# Patient Record
Sex: FEMALE | Race: WHITE | NOT HISPANIC OR LATINO | URBAN - METROPOLITAN AREA
[De-identification: names, ages, dates, MRNs, and addresses within clinical notes are randomized per-mention and may not be internally consistent; named-entity substitution may affect disease eponyms.]

---

## 2017-10-26 ENCOUNTER — OUTPATIENT (OUTPATIENT)
Dept: OUTPATIENT SERVICES | Facility: HOSPITAL | Age: 37
LOS: 1 days | Discharge: HOME | End: 2017-10-26

## 2017-10-30 LAB — SURGICAL PATHOLOGY STUDY: SIGNIFICANT CHANGE UP

## 2017-11-01 DIAGNOSIS — N84.0 POLYP OF CORPUS UTERI: ICD-10-CM

## 2017-11-01 DIAGNOSIS — Z88.1 ALLERGY STATUS TO OTHER ANTIBIOTIC AGENTS STATUS: ICD-10-CM

## 2018-09-21 ENCOUNTER — OUTPATIENT (OUTPATIENT)
Dept: OUTPATIENT SERVICES | Facility: HOSPITAL | Age: 38
LOS: 1 days | End: 2018-09-21
Payer: COMMERCIAL

## 2018-09-21 DIAGNOSIS — O26.899 OTHER SPECIFIED PREGNANCY RELATED CONDITIONS, UNSPECIFIED TRIMESTER: ICD-10-CM

## 2018-09-21 DIAGNOSIS — Z3A.00 WEEKS OF GESTATION OF PREGNANCY NOT SPECIFIED: ICD-10-CM

## 2018-09-21 LAB — AMNISURE ROM (RUPTURE OF MEMBRANES): NEGATIVE — SIGNIFICANT CHANGE UP

## 2018-09-21 PROCEDURE — 99214 OFFICE O/P EST MOD 30 MIN: CPT

## 2018-09-21 PROCEDURE — 84112 EVAL AMNIOTIC FLUID PROTEIN: CPT

## 2018-10-04 ENCOUNTER — INPATIENT (INPATIENT)
Facility: HOSPITAL | Age: 38
LOS: 2 days | Discharge: HOME | End: 2018-10-07
Attending: OBSTETRICS & GYNECOLOGY | Admitting: OBSTETRICS & GYNECOLOGY
Payer: COMMERCIAL

## 2018-10-04 VITALS — HEIGHT: 60 IN | WEIGHT: 134.92 LBS

## 2018-10-04 DIAGNOSIS — O26.899 OTHER SPECIFIED PREGNANCY RELATED CONDITIONS, UNSPECIFIED TRIMESTER: ICD-10-CM

## 2018-10-04 DIAGNOSIS — Z3A.00 WEEKS OF GESTATION OF PREGNANCY NOT SPECIFIED: ICD-10-CM

## 2018-10-04 LAB
BASOPHILS NFR BLD AUTO: 0.1 % — SIGNIFICANT CHANGE UP (ref 0–2)
BLD GP AB SCN SERPL QL: POSITIVE — SIGNIFICANT CHANGE UP
BLD GP AB SCN SERPL QL: POSITIVE — SIGNIFICANT CHANGE UP
EOSINOPHIL NFR BLD AUTO: 0.1 % — SIGNIFICANT CHANGE UP (ref 0–6)
HCT VFR BLD CALC: 39.6 % — SIGNIFICANT CHANGE UP (ref 34.5–45)
HGB BLD-MCNC: 13.6 G/DL — SIGNIFICANT CHANGE UP (ref 11.5–15.5)
LYMPHOCYTES # BLD AUTO: 8.6 % — LOW (ref 13–44)
MCHC RBC-ENTMCNC: 31.8 PG — SIGNIFICANT CHANGE UP (ref 27–34)
MCHC RBC-ENTMCNC: 34.3 G/DL — SIGNIFICANT CHANGE UP (ref 32–36)
MCV RBC AUTO: 92.5 FL — SIGNIFICANT CHANGE UP (ref 80–100)
MONOCYTES NFR BLD AUTO: 7 % — SIGNIFICANT CHANGE UP (ref 2–14)
NEUTROPHILS NFR BLD AUTO: 84.2 % — HIGH (ref 43–77)
PLATELET # BLD AUTO: 163 K/UL — SIGNIFICANT CHANGE UP (ref 150–400)
RBC # BLD: 4.28 M/UL — SIGNIFICANT CHANGE UP (ref 3.8–5.2)
RBC # FLD: 13.4 % — SIGNIFICANT CHANGE UP (ref 10.3–16.9)
RH IG SCN BLD-IMP: NEGATIVE — SIGNIFICANT CHANGE UP
RH IG SCN BLD-IMP: NEGATIVE — SIGNIFICANT CHANGE UP
WBC # BLD: 11.2 K/UL — HIGH (ref 3.8–10.5)
WBC # FLD AUTO: 11.2 K/UL — HIGH (ref 3.8–10.5)

## 2018-10-04 PROCEDURE — 86077 PHYS BLOOD BANK SERV XMATCH: CPT

## 2018-10-04 RX ORDER — ONDANSETRON 8 MG/1
2 TABLET, FILM COATED ORAL EVERY 6 HOURS
Qty: 0 | Refills: 0 | Status: DISCONTINUED | OUTPATIENT
Start: 2018-10-04 | End: 2018-10-07

## 2018-10-04 RX ORDER — CEFAZOLIN SODIUM 1 G
2000 VIAL (EA) INJECTION EVERY 8 HOURS
Qty: 0 | Refills: 0 | Status: COMPLETED | OUTPATIENT
Start: 2018-10-04 | End: 2018-10-05

## 2018-10-04 RX ORDER — PENICILLIN G POTASSIUM 5000000 [IU]/1
POWDER, FOR SOLUTION INTRAMUSCULAR; INTRAPLEURAL; INTRATHECAL; INTRAVENOUS
Qty: 0 | Refills: 0 | Status: DISCONTINUED | OUTPATIENT
Start: 2018-10-04 | End: 2018-10-04

## 2018-10-04 RX ORDER — LANOLIN
1 OINTMENT (GRAM) TOPICAL
Qty: 0 | Refills: 0 | Status: DISCONTINUED | OUTPATIENT
Start: 2018-10-04 | End: 2018-10-07

## 2018-10-04 RX ORDER — SODIUM CHLORIDE 9 MG/ML
1000 INJECTION, SOLUTION INTRAVENOUS
Qty: 0 | Refills: 0 | Status: DISCONTINUED | OUTPATIENT
Start: 2018-10-04 | End: 2018-10-04

## 2018-10-04 RX ORDER — SODIUM CHLORIDE 9 MG/ML
1000 INJECTION, SOLUTION INTRAVENOUS ONCE
Qty: 0 | Refills: 0 | Status: COMPLETED | OUTPATIENT
Start: 2018-10-04 | End: 2018-10-04

## 2018-10-04 RX ORDER — NALOXONE HYDROCHLORIDE 4 MG/.1ML
0.1 SPRAY NASAL
Qty: 0 | Refills: 0 | Status: DISCONTINUED | OUTPATIENT
Start: 2018-10-04 | End: 2018-10-07

## 2018-10-04 RX ORDER — FERROUS SULFATE 325(65) MG
325 TABLET ORAL DAILY
Qty: 0 | Refills: 0 | Status: DISCONTINUED | OUTPATIENT
Start: 2018-10-04 | End: 2018-10-07

## 2018-10-04 RX ORDER — PENICILLIN G POTASSIUM 5000000 [IU]/1
2.5 POWDER, FOR SOLUTION INTRAMUSCULAR; INTRAPLEURAL; INTRATHECAL; INTRAVENOUS EVERY 4 HOURS
Qty: 0 | Refills: 0 | Status: DISCONTINUED | OUTPATIENT
Start: 2018-10-04 | End: 2018-10-04

## 2018-10-04 RX ORDER — FENTANYL/BUPIVACAINE/NS/PF 2MCG/ML-.1
250 PLASTIC BAG, INJECTION (ML) INJECTION
Qty: 0 | Refills: 0 | Status: DISCONTINUED | OUTPATIENT
Start: 2018-10-04 | End: 2018-10-04

## 2018-10-04 RX ORDER — CEFAZOLIN SODIUM 1 G
1000 VIAL (EA) INJECTION EVERY 8 HOURS
Qty: 0 | Refills: 0 | Status: DISCONTINUED | OUTPATIENT
Start: 2018-10-04 | End: 2018-10-04

## 2018-10-04 RX ORDER — IBUPROFEN 200 MG
600 TABLET ORAL EVERY 6 HOURS
Qty: 0 | Refills: 0 | Status: DISCONTINUED | OUTPATIENT
Start: 2018-10-04 | End: 2018-10-07

## 2018-10-04 RX ORDER — OXYCODONE AND ACETAMINOPHEN 5; 325 MG/1; MG/1
1 TABLET ORAL
Qty: 0 | Refills: 0 | Status: DISCONTINUED | OUTPATIENT
Start: 2018-10-04 | End: 2018-10-07

## 2018-10-04 RX ORDER — DOCUSATE SODIUM 100 MG
100 CAPSULE ORAL
Qty: 0 | Refills: 0 | Status: DISCONTINUED | OUTPATIENT
Start: 2018-10-04 | End: 2018-10-07

## 2018-10-04 RX ORDER — SODIUM CHLORIDE 9 MG/ML
1000 INJECTION, SOLUTION INTRAVENOUS
Qty: 0 | Refills: 0 | Status: DISCONTINUED | OUTPATIENT
Start: 2018-10-04 | End: 2018-10-07

## 2018-10-04 RX ORDER — CEFAZOLIN SODIUM 1 G
VIAL (EA) INJECTION
Qty: 0 | Refills: 0 | Status: DISCONTINUED | OUTPATIENT
Start: 2018-10-04 | End: 2018-10-04

## 2018-10-04 RX ORDER — METOCLOPRAMIDE HCL 10 MG
10 TABLET ORAL ONCE
Qty: 0 | Refills: 0 | Status: DISCONTINUED | OUTPATIENT
Start: 2018-10-04 | End: 2018-10-07

## 2018-10-04 RX ORDER — DIPHENHYDRAMINE HCL 50 MG
25 CAPSULE ORAL EVERY 6 HOURS
Qty: 0 | Refills: 0 | Status: DISCONTINUED | OUTPATIENT
Start: 2018-10-04 | End: 2018-10-07

## 2018-10-04 RX ORDER — OXYCODONE AND ACETAMINOPHEN 5; 325 MG/1; MG/1
2 TABLET ORAL EVERY 6 HOURS
Qty: 0 | Refills: 0 | Status: DISCONTINUED | OUTPATIENT
Start: 2018-10-04 | End: 2018-10-07

## 2018-10-04 RX ORDER — OXYTOCIN 10 UNIT/ML
333.33 VIAL (ML) INJECTION
Qty: 20 | Refills: 0 | Status: DISCONTINUED | OUTPATIENT
Start: 2018-10-04 | End: 2018-10-04

## 2018-10-04 RX ORDER — PENICILLIN G POTASSIUM 5000000 [IU]/1
5 POWDER, FOR SOLUTION INTRAMUSCULAR; INTRAPLEURAL; INTRATHECAL; INTRAVENOUS ONCE
Qty: 0 | Refills: 0 | Status: COMPLETED | OUTPATIENT
Start: 2018-10-04 | End: 2018-10-04

## 2018-10-04 RX ORDER — CEFAZOLIN SODIUM 1 G
2000 VIAL (EA) INJECTION ONCE
Qty: 0 | Refills: 0 | Status: DISCONTINUED | OUTPATIENT
Start: 2018-10-04 | End: 2018-10-04

## 2018-10-04 RX ORDER — ACETAMINOPHEN 500 MG
650 TABLET ORAL EVERY 6 HOURS
Qty: 0 | Refills: 0 | Status: DISCONTINUED | OUTPATIENT
Start: 2018-10-04 | End: 2018-10-07

## 2018-10-04 RX ORDER — HEPARIN SODIUM 5000 [USP'U]/ML
5000 INJECTION INTRAVENOUS; SUBCUTANEOUS EVERY 12 HOURS
Qty: 0 | Refills: 0 | Status: DISCONTINUED | OUTPATIENT
Start: 2018-10-05 | End: 2018-10-07

## 2018-10-04 RX ORDER — OXYTOCIN 10 UNIT/ML
333.33 VIAL (ML) INJECTION
Qty: 20 | Refills: 0 | Status: COMPLETED | OUTPATIENT
Start: 2018-10-04

## 2018-10-04 RX ORDER — GLYCERIN ADULT
1 SUPPOSITORY, RECTAL RECTAL AT BEDTIME
Qty: 0 | Refills: 0 | Status: DISCONTINUED | OUTPATIENT
Start: 2018-10-04 | End: 2018-10-07

## 2018-10-04 RX ORDER — CITRIC ACID/SODIUM CITRATE 300-500 MG
15 SOLUTION, ORAL ORAL ONCE
Qty: 0 | Refills: 0 | Status: DISCONTINUED | OUTPATIENT
Start: 2018-10-04 | End: 2018-10-04

## 2018-10-04 RX ORDER — SIMETHICONE 80 MG/1
80 TABLET, CHEWABLE ORAL EVERY 4 HOURS
Qty: 0 | Refills: 0 | Status: DISCONTINUED | OUTPATIENT
Start: 2018-10-04 | End: 2018-10-07

## 2018-10-04 RX ORDER — TETANUS TOXOID, REDUCED DIPHTHERIA TOXOID AND ACELLULAR PERTUSSIS VACCINE, ADSORBED 5; 2.5; 8; 8; 2.5 [IU]/.5ML; [IU]/.5ML; UG/.5ML; UG/.5ML; UG/.5ML
0.5 SUSPENSION INTRAMUSCULAR ONCE
Qty: 0 | Refills: 0 | Status: DISCONTINUED | OUTPATIENT
Start: 2018-10-04 | End: 2018-10-07

## 2018-10-04 RX ORDER — OXYTOCIN 10 UNIT/ML
41.67 VIAL (ML) INJECTION
Qty: 20 | Refills: 0 | Status: DISCONTINUED | OUTPATIENT
Start: 2018-10-04 | End: 2018-10-07

## 2018-10-04 RX ADMIN — Medication 0.2 MILLIGRAM(S): at 17:30

## 2018-10-04 RX ADMIN — PENICILLIN G POTASSIUM 200 MILLION UNIT(S): 5000000 POWDER, FOR SOLUTION INTRAMUSCULAR; INTRAPLEURAL; INTRATHECAL; INTRAVENOUS at 09:52

## 2018-10-04 RX ADMIN — PENICILLIN G POTASSIUM 200 MILLION UNIT(S): 5000000 POWDER, FOR SOLUTION INTRAMUSCULAR; INTRAPLEURAL; INTRATHECAL; INTRAVENOUS at 14:04

## 2018-10-04 RX ADMIN — SODIUM CHLORIDE 125 MILLILITER(S): 9 INJECTION, SOLUTION INTRAVENOUS at 15:15

## 2018-10-04 RX ADMIN — SODIUM CHLORIDE 1000 MILLILITER(S): 9 INJECTION, SOLUTION INTRAVENOUS at 17:20

## 2018-10-04 RX ADMIN — SODIUM CHLORIDE 2000 MILLILITER(S): 9 INJECTION, SOLUTION INTRAVENOUS at 09:52

## 2018-10-04 RX ADMIN — Medication 100 MILLIGRAM(S): at 23:07

## 2018-10-04 RX ADMIN — Medication 600 MILLIGRAM(S): at 23:55

## 2018-10-05 LAB
BASOPHILS NFR BLD AUTO: 0.1 % — SIGNIFICANT CHANGE UP (ref 0–2)
EOSINOPHIL NFR BLD AUTO: 0 % — SIGNIFICANT CHANGE UP (ref 0–6)
HCT VFR BLD CALC: 28.4 % — LOW (ref 34.5–45)
HGB BLD-MCNC: 9.3 G/DL — LOW (ref 11.5–15.5)
LYMPHOCYTES # BLD AUTO: 6.5 % — LOW (ref 13–44)
MCHC RBC-ENTMCNC: 30.3 PG — SIGNIFICANT CHANGE UP (ref 27–34)
MCHC RBC-ENTMCNC: 32.7 G/DL — SIGNIFICANT CHANGE UP (ref 32–36)
MCV RBC AUTO: 92.5 FL — SIGNIFICANT CHANGE UP (ref 80–100)
MONOCYTES NFR BLD AUTO: 9.5 % — SIGNIFICANT CHANGE UP (ref 2–14)
NEUTROPHILS NFR BLD AUTO: 83.9 % — HIGH (ref 43–77)
PLATELET # BLD AUTO: 150 K/UL — SIGNIFICANT CHANGE UP (ref 150–400)
RBC # BLD: 3.07 M/UL — LOW (ref 3.8–5.2)
RBC # FLD: 13.1 % — SIGNIFICANT CHANGE UP (ref 10.3–16.9)
T PALLIDUM AB TITR SER: NEGATIVE — SIGNIFICANT CHANGE UP
WBC # BLD: 17.6 K/UL — HIGH (ref 3.8–10.5)
WBC # FLD AUTO: 17.6 K/UL — HIGH (ref 3.8–10.5)

## 2018-10-05 RX ADMIN — Medication 1 TABLET(S): at 17:45

## 2018-10-05 RX ADMIN — Medication 600 MILLIGRAM(S): at 18:37

## 2018-10-05 RX ADMIN — HEPARIN SODIUM 5000 UNIT(S): 5000 INJECTION INTRAVENOUS; SUBCUTANEOUS at 06:58

## 2018-10-05 RX ADMIN — Medication 325 MILLIGRAM(S): at 17:45

## 2018-10-05 RX ADMIN — Medication 600 MILLIGRAM(S): at 13:30

## 2018-10-05 RX ADMIN — Medication 100 MILLIGRAM(S): at 13:45

## 2018-10-05 RX ADMIN — Medication 600 MILLIGRAM(S): at 23:57

## 2018-10-05 RX ADMIN — Medication 100 MILLIGRAM(S): at 17:45

## 2018-10-05 RX ADMIN — Medication 600 MILLIGRAM(S): at 17:45

## 2018-10-05 RX ADMIN — HEPARIN SODIUM 5000 UNIT(S): 5000 INJECTION INTRAVENOUS; SUBCUTANEOUS at 17:47

## 2018-10-05 RX ADMIN — Medication 600 MILLIGRAM(S): at 00:25

## 2018-10-05 RX ADMIN — Medication 100 MILLIGRAM(S): at 06:21

## 2018-10-05 RX ADMIN — Medication 600 MILLIGRAM(S): at 07:12

## 2018-10-05 RX ADMIN — Medication 600 MILLIGRAM(S): at 12:34

## 2018-10-05 RX ADMIN — Medication 600 MILLIGRAM(S): at 06:21

## 2018-10-05 NOTE — PROGRESS NOTE ADULT - ASSESSMENT
A/P   38y  s/p  section, POD #1, stable  -  Pain: PO motrin, percocets for severe pain PRN  -  Post-operatively labs: post-op Hgb AM CBC pending , hemodynamically stable, no symptoms of anemia   -  GI: tolerating clears, passing gas, ADAT  -  : pham in situ; DC this AM, f/u TOV  -  DVT prophylaxis: ambulation, SCDs, SQH  -  Dispo: POD 3 or 4

## 2018-10-05 NOTE — LACTATION INITIAL EVALUATION - PRO FEM REPRO BREAST PUMP YN
yes/[employer]ela symphony breast pump given to the mother with instructions for use with demonstration and return demo.

## 2018-10-05 NOTE — LACTATION INITIAL EVALUATION - INFANT FEEDING PLAN COMMENT, OB PROFILE
To breast feed on demand q 2-3 hours, 8-12 times q 24 hours [ @ least 8 times q 24 hours ]. A triple feeding plan of care is to be initiated until the  is latching on and breast feeding well. The mother is aware that formula may have to be given if she does not hand express or pump 5-15 mls q 2-3 hours.

## 2018-10-05 NOTE — LACTATION INITIAL EVALUATION - MILK SUPPLY
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 Her nipples are not cracked and the colostrum is brownish in color.

## 2018-10-05 NOTE — LACTATION INITIAL EVALUATION - REQUESTED BY
lactation rounds, initial visit. Primaparous mother delivered @ 403/7 weeks gestation via c/s. The  was ~ 27 hours old when I consulted @ the bedside. The  has had 4 voids and 4 stools documented since delivery. The mother reported that the  has been sleepy and only latches on and breast feeds for 5 minutes or less 3-4 times since delivery. The  is now ~ 27 hours old. The mother was assisted several times to latch the  to the breast unsuccessfully. The  is sleepy and not gaping to be able to latch on. A triple feeding plan of care is to be initiated. Breast feeding guidelines, positioning the  to the breast, latching strategies, early feeding cues, hand expression, pumping guidelines, breast pump kit care, storage and handling of breast milk, a triple feeding plan of care and 's output requirements reviewed with the mother. Written literature was given to the mother on the above. S2S was encouraged. All questions were answered and the mother verbalized understanding. I encouraged the mother to ask for lactation assistance as needed. The bedside RN was notified that a triple feeding plan of care is to be initiated. The mother is presently pumping. To be F/U by the IBCLC tomorrow.

## 2018-10-06 RX ADMIN — Medication 325 MILLIGRAM(S): at 12:33

## 2018-10-06 RX ADMIN — Medication 1 TABLET(S): at 12:33

## 2018-10-06 RX ADMIN — Medication 600 MILLIGRAM(S): at 12:33

## 2018-10-06 RX ADMIN — Medication 600 MILLIGRAM(S): at 07:30

## 2018-10-06 RX ADMIN — OXYCODONE AND ACETAMINOPHEN 2 TABLET(S): 5; 325 TABLET ORAL at 06:47

## 2018-10-06 RX ADMIN — Medication 600 MILLIGRAM(S): at 01:00

## 2018-10-06 RX ADMIN — Medication 600 MILLIGRAM(S): at 06:33

## 2018-10-06 RX ADMIN — Medication 600 MILLIGRAM(S): at 18:11

## 2018-10-06 RX ADMIN — OXYCODONE AND ACETAMINOPHEN 2 TABLET(S): 5; 325 TABLET ORAL at 07:32

## 2018-10-06 RX ADMIN — Medication 600 MILLIGRAM(S): at 18:13

## 2018-10-06 RX ADMIN — HEPARIN SODIUM 5000 UNIT(S): 5000 INJECTION INTRAVENOUS; SUBCUTANEOUS at 06:34

## 2018-10-06 RX ADMIN — Medication 600 MILLIGRAM(S): at 13:26

## 2018-10-06 RX ADMIN — HEPARIN SODIUM 5000 UNIT(S): 5000 INJECTION INTRAVENOUS; SUBCUTANEOUS at 18:12

## 2018-10-06 NOTE — DISCHARGE NOTE OB - PATIENT PORTAL LINK FT
You can access the [facility] Patient Portal, offered by [facility], by registering with the following website: [email or web address]

## 2018-10-06 NOTE — DISCHARGE NOTE OB - CARE PROVIDER_API CALL
Lashell Louis), Obstetrics and Gynecology  [address] [place] [address]  Phone: [phone]  Fax: [phone]

## 2018-10-06 NOTE — DISCHARGE NOTE OB - CARE PLAN
Principal Discharge DX:	40 weeks gestation of pregnancy  Goal:	follow up 2 weeks  Assessment and plan of treatment:	pelvic rest 6 weeks

## 2018-10-07 VITALS
OXYGEN SATURATION: 97 % | TEMPERATURE: 98 F | HEART RATE: 79 BPM | SYSTOLIC BLOOD PRESSURE: 102 MMHG | RESPIRATION RATE: 18 BRPM | DIASTOLIC BLOOD PRESSURE: 68 MMHG

## 2018-10-07 PROCEDURE — C1765: CPT

## 2018-10-07 PROCEDURE — 99214 OFFICE O/P EST MOD 30 MIN: CPT

## 2018-10-07 PROCEDURE — 86850 RBC ANTIBODY SCREEN: CPT

## 2018-10-07 PROCEDURE — 85025 COMPLETE CBC W/AUTO DIFF WBC: CPT

## 2018-10-07 PROCEDURE — 86901 BLOOD TYPING SEROLOGIC RH(D): CPT

## 2018-10-07 PROCEDURE — 86880 COOMBS TEST DIRECT: CPT

## 2018-10-07 PROCEDURE — 88307 TISSUE EXAM BY PATHOLOGIST: CPT

## 2018-10-07 PROCEDURE — 36415 COLL VENOUS BLD VENIPUNCTURE: CPT

## 2018-10-07 PROCEDURE — 86870 RBC ANTIBODY IDENTIFICATION: CPT

## 2018-10-07 PROCEDURE — 86780 TREPONEMA PALLIDUM: CPT

## 2018-10-07 PROCEDURE — 86900 BLOOD TYPING SEROLOGIC ABO: CPT

## 2018-10-07 RX ADMIN — Medication 600 MILLIGRAM(S): at 06:22

## 2018-10-07 RX ADMIN — Medication 600 MILLIGRAM(S): at 07:20

## 2018-10-07 RX ADMIN — HEPARIN SODIUM 5000 UNIT(S): 5000 INJECTION INTRAVENOUS; SUBCUTANEOUS at 06:22

## 2018-10-07 RX ADMIN — Medication 600 MILLIGRAM(S): at 01:00

## 2018-10-07 RX ADMIN — Medication 1 TABLET(S): at 11:23

## 2018-10-07 RX ADMIN — Medication 600 MILLIGRAM(S): at 00:38

## 2018-10-07 RX ADMIN — Medication 325 MILLIGRAM(S): at 11:23

## 2018-10-07 RX ADMIN — Medication 100 MILLIGRAM(S): at 11:24

## 2018-10-07 RX ADMIN — Medication 600 MILLIGRAM(S): at 11:23

## 2018-10-07 RX ADMIN — Medication 600 MILLIGRAM(S): at 12:20

## 2018-10-07 NOTE — PROGRESS NOTE ADULT - SUBJECTIVE AND OBJECTIVE BOX
Patient evaluated at bedside.   She reports pain is well controlled with OPM.  She has been ambulating without assistance, voiding spontaneously, passing gas, tolerating regular diet and is breastfeeding.    She denies HA, dizziness, CP, palpitations, SOB, n/v, or heavy vaginal bleeding.    Physical Exam:  vss  Gen: NAD  Abd: + BS, soft, nontender, nondistended, no rebound or guarding  Incision clean, dry and intact  uterus firm at midline,   fb below umbilicus  : lochia WNL  Extremities: no swelling or calf tenderness    pod3 cs stable
Patient evaluated at bedside this morning, resting comfortable in bed, with no acute events overnight.  She reports pain is well controlled with motrin.   She denies headache, dizziness, chest pain, palpitations, shortness of breathe, nausea, vomiting or heavy vaginal bleeding.  She has not tried ambulating since procedure, pham remains in place at this time. Tolerating clear liquids.     Physical Exam:  Vital Signs Last 24 Hrs  T(C): 36.8 (05 Oct 2018 06:00), Max: 36.8 (05 Oct 2018 06:00)  T(F): 98.2 (05 Oct 2018 06:00), Max: 98.2 (05 Oct 2018 06:00)  HR: 84 (05 Oct 2018 06:00) (66 - 106)  BP: 102/58 (05 Oct 2018 06:00) (83/39 - 118/66)  BP(mean): --  RR: 18 (05 Oct 2018 06:00) (13 - 24)  SpO2: 97% (05 Oct 2018 06:00) (94% - 98%)    GA: NAD, A+0 x 3  CV: RRR, no murmurs/rubs  Pulm: CTAB, no wheezes/rhonchi  Breasts: soft, nontender, no palpable masses  Abd: + BS, soft, nontender, nondistended, no rebound or guarding, incision clean, dry and intact, uterus firm at midline, 2 fb below umbilicus  : pham in situ, lochia WNL  Extremities: no swelling or calf tenderness, reflexes +2 bilaterally, SCD in place                            13.6   11.2  )-----------( 163      ( 04 Oct 2018 09:49 )             39.6               acetaminophen   Tablet .. 650 milliGRAM(s) Oral every 6 hours PRN  ceFAZolin   IVPB 2000 milliGRAM(s) IV Intermittent every 8 hours  diphenhydrAMINE 25 milliGRAM(s) Oral every 6 hours PRN  diphtheria/tetanus/pertussis (acellular) Vaccine (ADAcel) 0.5 milliLiter(s) IntraMuscular once  docusate sodium 100 milliGRAM(s) Oral two times a day PRN  ferrous    sulfate 325 milliGRAM(s) Oral daily  glycerin Suppository - Adult 1 Suppository(s) Rectal at bedtime PRN  heparin  Injectable 5000 Unit(s) SubCutaneous every 12 hours  ibuprofen  Tablet. 600 milliGRAM(s) Oral every 6 hours  lactated ringers. 1000 milliLiter(s) IV Continuous <Continuous>  lanolin Ointment 1 Application(s) Topical every 3 hours PRN  metoclopramide Injectable 10 milliGRAM(s) IV Push once PRN  naloxone Injectable 0.1 milliGRAM(s) IV Push every 3 minutes PRN  ondansetron Injectable 2 milliGRAM(s) IV Push every 6 hours PRN  oxyCODONE    5 mG/acetaminophen 325 mG 1 Tablet(s) Oral every 3 hours PRN  oxyCODONE    5 mG/acetaminophen 325 mG 2 Tablet(s) Oral every 6 hours PRN  oxytocin Infusion 41.667 milliUNIT(s)/Min IV Continuous <Continuous>  prenatal multivitamin 1 Tablet(s) Oral daily  simethicone 80 milliGRAM(s) Chew every 4 hours PRN
Patient evaluated at bedside.   She reports pain is well controlled with OPM.  She has been ambulating without assistance, voiding spontaneously, passing gas, tolerating regular diet and is breastfeeding.    She denies HA, dizziness, CP, palpitations, SOB, n/v, or heavy vaginal bleeding.    Physical Exam:  vss  Gen: NAD  Abd: + BS, soft, nontender, nondistended, no rebound or guarding  Incision clean, dry and intact  uterus firm at midline,   fb below umbilicus  : lochia WNL  Extremities: no swelling or calf tenderness    pod2 stable
Patient evaluated at bedside.   She reports pain is well controlled with OPM.  She has been ambulating without assistance, voiding spontaneously, passing gas, tolerating regular diet and is breastfeeding.    She denies HA, dizziness, CP, palpitations, SOB, n/v, or heavy vaginal bleeding.    Physical Exam:  vss  Gen: NAD  Abd: + BS, soft, nontender, nondistended, no rebound or guarding  Incision clean, dry and intact  uterus firm at midline,   fb below umbilicus  : lochia WNL  Extremities: no swelling or calf tenderness    pod2 stable
Patient evaluated at bedside.   She reports soreness but that pain is well controlled.  She denies headache, dizziness, chest pain, palpitations, shortness of breathe, nausea, vomiting or heavy vaginal bleeding.  She has been ambulating without assistance, voiding spontaneously, passing gas, tolerating regular diet and is breastfeeding. Patient would like to go home this afternoon.     Physical Exam:  Vital Signs Last 24 Hrs  T(C): 36.9 (07 Oct 2018 05:51), Max: 36.9 (07 Oct 2018 05:51)  T(F): 98.4 (07 Oct 2018 05:51), Max: 98.4 (07 Oct 2018 05:51)  HR: 68 (07 Oct 2018 05:51) (68 - 88)  BP: 104/58 (07 Oct 2018 05:51) (102/50 - 104/58)  BP(mean): --  RR: 18 (07 Oct 2018 05:51) (18 - 18)  SpO2: 97% (07 Oct 2018 05:51) (97% - 99%)      GA: NAD, resting comfortably, breastfeeding   Abd: + BS, soft, appropriately tender, nondistended, no rebound or guarding, uterus firm at midline,  fb below umbilicus  Incision: well approximated, no erythema or discharge  : lochia WNL  Extremities: no swelling or calf tenderness

## 2018-10-07 NOTE — PROGRESS NOTE ADULT - ASSESSMENT
A/P  39yo s/p c/s, POD #3, stable and meeting postoperative milestones appropriately.   1. Pain: controlled with OPM.   2. GI: Regular diet as tolerated   3. : voiding   4. DVT prophylaxis: ambulation, Subcutaneous heparin   5. Dispo: Likely POD3 or 4

## 2018-10-08 LAB — SURGICAL PATHOLOGY STUDY: SIGNIFICANT CHANGE UP

## 2018-10-11 DIAGNOSIS — Z34.03 ENCOUNTER FOR SUPERVISION OF NORMAL FIRST PREGNANCY, THIRD TRIMESTER: ICD-10-CM

## 2018-10-11 DIAGNOSIS — Z88.1 ALLERGY STATUS TO OTHER ANTIBIOTIC AGENTS STATUS: ICD-10-CM

## 2018-10-11 DIAGNOSIS — Z98.890 OTHER SPECIFIED POSTPROCEDURAL STATES: ICD-10-CM

## 2018-10-11 DIAGNOSIS — Z3A.40 40 WEEKS GESTATION OF PREGNANCY: ICD-10-CM

## 2018-10-11 DIAGNOSIS — Z23 ENCOUNTER FOR IMMUNIZATION: ICD-10-CM

## 2019-02-12 NOTE — DISCHARGE NOTE OB - MEDICATION SUMMARY - MEDICATIONS TO TAKE
I will START or STAY ON the medications listed below when I get home from the hospital:  None no tenderness

## 2023-06-19 NOTE — PATIENT PROFILE OB - [employer], BABY A
Detail Level: Detailed
Body Location Override (Optional - Billing Will Still Be Based On Selected Body Map Location If Applicable): right upper cutaneous lip at vermillion border
Size Of Lesion: 2 mm x 2 mm Dark brown macule (stable)
Body Location Override (Optional - Billing Will Still Be Based On Selected Body Map Location If Applicable): right medial foot
Size Of Lesion: 1 mm Brown macule (stable)
Size Of Lesion: 1mm brown macule
No
